# Patient Record
Sex: MALE | Race: WHITE | NOT HISPANIC OR LATINO
[De-identification: names, ages, dates, MRNs, and addresses within clinical notes are randomized per-mention and may not be internally consistent; named-entity substitution may affect disease eponyms.]

---

## 2021-06-23 ENCOUNTER — TRANSCRIPTION ENCOUNTER (OUTPATIENT)
Age: 26
End: 2021-06-23

## 2021-07-03 ENCOUNTER — TRANSCRIPTION ENCOUNTER (OUTPATIENT)
Age: 26
End: 2021-07-03

## 2021-07-11 ENCOUNTER — TRANSCRIPTION ENCOUNTER (OUTPATIENT)
Age: 26
End: 2021-07-11

## 2021-07-12 ENCOUNTER — TRANSCRIPTION ENCOUNTER (OUTPATIENT)
Age: 26
End: 2021-07-12

## 2021-08-11 ENCOUNTER — TRANSCRIPTION ENCOUNTER (OUTPATIENT)
Age: 26
End: 2021-08-11

## 2021-09-18 ENCOUNTER — TRANSCRIPTION ENCOUNTER (OUTPATIENT)
Age: 26
End: 2021-09-18

## 2021-10-07 ENCOUNTER — TRANSCRIPTION ENCOUNTER (OUTPATIENT)
Age: 26
End: 2021-10-07

## 2022-02-28 ENCOUNTER — TRANSCRIPTION ENCOUNTER (OUTPATIENT)
Age: 27
End: 2022-02-28

## 2022-09-12 ENCOUNTER — NON-APPOINTMENT (OUTPATIENT)
Age: 27
End: 2022-09-12

## 2022-12-12 ENCOUNTER — NON-APPOINTMENT (OUTPATIENT)
Age: 27
End: 2022-12-12

## 2023-05-03 PROBLEM — Z00.00 ENCOUNTER FOR PREVENTIVE HEALTH EXAMINATION: Status: ACTIVE | Noted: 2023-05-03

## 2023-05-04 ENCOUNTER — APPOINTMENT (OUTPATIENT)
Dept: ORTHOPEDIC SURGERY | Facility: CLINIC | Age: 28
End: 2023-05-04
Payer: MEDICAID

## 2023-05-04 ENCOUNTER — NON-APPOINTMENT (OUTPATIENT)
Age: 28
End: 2023-05-04

## 2023-05-04 VITALS — BODY MASS INDEX: 29.84 KG/M2 | HEIGHT: 75 IN | WEIGHT: 240 LBS

## 2023-05-04 DIAGNOSIS — Q74.0 OTHER CONGENITAL MALFORMATIONS OF UPPER LIMB(S), INCLUDING SHOULDER GIRDLE: ICD-10-CM

## 2023-05-04 PROCEDURE — 99203 OFFICE O/P NEW LOW 30 MIN: CPT

## 2023-05-08 PROBLEM — Q74.0: Status: ACTIVE | Noted: 2023-05-08

## 2023-05-08 NOTE — HISTORY OF PRESENT ILLNESS
[de-identified] : Initial Visit for Right Shoulder\par Reason: Labrum Tear at Right Shoulder / Left Shoulder - Tear \par Duration: 07/2022  \par Prior Studies: [facility] - CT 03/2023 / MRI 07/2022  / PT \par Medical Hx: Right Shoulder Dislocation \par Dr. Mark Davidson - Recommended for Surgery Consult \par Aggravating FX: Over Head / Movement above Shoulder Height / Lifting\par Symptoms: Prolonged use causes constant / Radiates towards Scapula\par Alleviating FX: PT/ ICE \par Pain Medication: Ibuprofen - Temporary Relief \par NKDA

## 2023-05-08 NOTE — REASON FOR VISIT
[Initial Visit] : an initial visit for [Shoulder Pain] : shoulder pain [FreeTextEntry2] : Right [place]

## 2023-05-08 NOTE — ASSESSMENT
[FreeTextEntry1] : Lengthy discussion was had with the patient regarding exam history and particular imaging as well as the hypoplastic posterior glenoid deficiency and treatment options at this time patient elects to consider his options and I reviewed at length with him recommendation would be at this point given failure of nonoperative measures for surgical consideration.  Patient expresses understanding that ultimately given that glenoid height was plasia he may require posterior bone grafting glenoid reconstruction but I did review with him consideration to arthroscopic stabilization procedures eluding anterior labral stabilization with capsulorrhaphy as well as consideration of remplissage.  I explained at length that this may not fully alleviate all his symptoms and he would like to consider his options

## 2023-05-08 NOTE — PHYSICAL EXAM
[de-identified] : Right Shoulder:\par Constitutional:\par The patient is healthy-appearing and in no apparent distress. \par \par Cardiovascular System: \par The capillary refill is less than 2 seconds. \par \par Skin: \par There are no skin abnormalities.\par \par C-Spine/Neck:\par \par Active Range of Motion:\par Flexion				50\par Extension			60\par Lateral rotation			80  \par \par Right Shoulder: \par Inspection: \par There is no atrophy, erythema, warmth, swelling.\par There is no scapular winging.\par There is no AC prominence. \par \par Bony Palpation: \par There is no tenderness of the clavicle.\par There is no tenderness of the acromioclavicular joint.\par There is no tenderness of the greater tuberosity. \par There is no tenderness of the bicipital groove.\par  \par Soft Tissue Palpation: \par There is no tenderness of the trapezius.\par There is no tenderness of the rhomboid.\par There is no tenderness of the subacromial bursa. \par \par Active Range of Motion: \par Forward flexion- 				180 \par Abduction-					150\par External rotation at 0 degrees abduction-	80 \par Internal rotation at 0 degrees abduction-	80\par \par Passive Range of Motion: \par Forward flexion- 			180 \par Abduction-				150\par External rotation at 0 deg abduction-	80 \par Internal rotation at 0 deg abduction-	80\par \par Special Tests: \par Hawkin's  				Positive \par Neer's  				Positive \par Speed's  				Negative\par AC cross-over 			            Negative\par Arcadia's  				Negative\par \par Stability: \par There is no general laxity. \par There is POSITVIE anterior apprehension.\par \par Strength: \par Supraspinatus abduction 		5/5\par External rotation at 0 deg abduction 	5/5\par Internal rotation at 0 deg abduction	5/5\par Scapular elevation 			5/5 \par \par Neurological System: \par \par There is normal sensation to light touch C5-T1. \par \par Stability: \par There is no general laxity. \par \par Psychiatric: \par The patient demonstrates a normal mood and affect and is active and alert.\par  [de-identified] : MRI of right shoulder reveals anterior labral tearing with a hypoplastic posterior posterior glenoid mild posterior subluxation of the humeral head on the glenoid.\par CT of right shoulder.  There is a hypoplastic posterior glenoid as well as mild posterior subluxation

## 2023-07-14 ENCOUNTER — APPOINTMENT (OUTPATIENT)
Dept: ORTHOPEDIC SURGERY | Facility: HOSPITAL | Age: 28
End: 2023-07-14
Payer: MEDICAID

## 2023-07-14 PROCEDURE — 29806 SHO ARTHRS SRG CAPSULORRAPHY: CPT | Mod: RT

## 2023-07-18 ENCOUNTER — APPOINTMENT (OUTPATIENT)
Dept: ORTHOPEDIC SURGERY | Facility: CLINIC | Age: 28
End: 2023-07-18
Payer: MEDICAID

## 2023-07-18 ENCOUNTER — TRANSCRIPTION ENCOUNTER (OUTPATIENT)
Age: 28
End: 2023-07-18

## 2023-07-18 VITALS — BODY MASS INDEX: 29.84 KG/M2 | WEIGHT: 240 LBS | HEIGHT: 75 IN

## 2023-07-18 PROCEDURE — 99024 POSTOP FOLLOW-UP VISIT: CPT

## 2023-07-18 NOTE — HISTORY OF PRESENT ILLNESS
[de-identified] : s/p RIGHT shoulder arthroscopy with posterior labral repair, capsulorraphy [de-identified] : Patient is 4 days postop and states overall he is feeling much improved but he is requiring intermittent pain medication.  States he has been maintaining the sling at all times except for shower.  He denies any paresthesias [de-identified] : On exam of the right shoulder, postop sling is intact dressing was removed wounds are clean dry and intact with sutures which were removed and Steri-Stripped.  Normal sensation light touch throughout the upper extremity C5-T1 with full range of motion about the elbow [de-identified] : s/p RIGHT shoulder arthroscopy with posterior labral repair, capsulorraphy [de-identified] : Discussed at length with patient intraoperative findings and underlying arthritis as well as capsulorrhaphy and labral repair and patient is time expresses understanding and will maintain the sling at all times for the next 3 weeks with follow-up at that time with daily elbow range of motion exercises reviewed

## 2023-07-20 ENCOUNTER — TRANSCRIPTION ENCOUNTER (OUTPATIENT)
Age: 28
End: 2023-07-20

## 2023-07-20 RX ORDER — OXYCODONE AND ACETAMINOPHEN 5; 325 MG/1; MG/1
5-325 TABLET ORAL
Qty: 40 | Refills: 0 | Status: ACTIVE | COMMUNITY
Start: 2023-07-14 | End: 1900-01-01

## 2023-08-08 ENCOUNTER — APPOINTMENT (OUTPATIENT)
Dept: ORTHOPEDIC SURGERY | Facility: CLINIC | Age: 28
End: 2023-08-08

## 2023-08-08 ENCOUNTER — APPOINTMENT (OUTPATIENT)
Dept: ORTHOPEDIC SURGERY | Facility: CLINIC | Age: 28
End: 2023-08-08
Payer: MEDICAID

## 2023-08-08 PROCEDURE — 99024 POSTOP FOLLOW-UP VISIT: CPT

## 2023-08-08 RX ORDER — OXYCODONE AND ACETAMINOPHEN 5; 325 MG/1; MG/1
5-325 TABLET ORAL
Qty: 30 | Refills: 0 | Status: ACTIVE | COMMUNITY
Start: 2023-08-08 | End: 1900-01-01

## 2023-08-08 NOTE — HISTORY OF PRESENT ILLNESS
[de-identified] : s/p RIGHT shoulder arthroscopy with posterior labral repair, capsulorraphy [de-identified] : Patient is 3.5 weeks postop and states overall he is feeling much improved but he is requiring intermittent pain medication.  States he has been removing his sling at [facility] and using his arm and moving his shoulder for some activities (against medical advice).   He denies any paresthesias [de-identified] : On exam of the right shoulder, postop sling is intact.  Wounds are healed. Normal sensation light touch throughout the upper extremity C5-T1 with full range of motion about the elbow.  Shoulder AROM:  FF- 100, ER- 60 IR 90 [de-identified] : s/p RIGHT shoulder arthroscopy with posterior labral repair, capsulorraphy [de-identified] : Discussed at length with patient intraoperative findings and underlying arthritis as well as capsulorrhaphy and labral repair,  .  Reviewed at length with patient my prior recommendations both preop and postop call and postop appointment for sling immobilization to maximize efficacy of the surgery-patient acknowledges this prior conversation.  Sling for the next 2 days and then begin physical therapy in 1 week and he will follow-up in 3 weeks

## 2023-08-29 ENCOUNTER — APPOINTMENT (OUTPATIENT)
Dept: ORTHOPEDIC SURGERY | Facility: CLINIC | Age: 28
End: 2023-08-29
Payer: MEDICAID

## 2023-08-29 PROCEDURE — 99024 POSTOP FOLLOW-UP VISIT: CPT

## 2023-08-29 NOTE — HISTORY OF PRESENT ILLNESS
[de-identified] : s/p RIGHT shoulder arthroscopy with posterior labral repair, capsulorraphy [de-identified] : Patient is 6.5 weeks postop and states overall he is feeling much improved.  He denies any paresthesias Patient states he has not begun any physical therapy as of yet  [de-identified] : On exam of the right shoulder. Normal sensation light touch throughout the upper extremity C5-T1 with full range of motion about the elbow.  Shoulder AROM:  FF- 120, ER- 60 IR 90 [de-identified] : s/p RIGHT shoulder arthroscopy with posterior labral repair, capsulorraphy [de-identified] : Follow-up in 4 weeks.  Discussed at length with patient intraoperative findings and underlying arthritis as well as capsulorrhaphy and labral repair.  Activity restrictions discussed.

## 2023-08-30 ENCOUNTER — TRANSCRIPTION ENCOUNTER (OUTPATIENT)
Age: 28
End: 2023-08-30

## 2023-08-30 RX ORDER — OXYCODONE AND ACETAMINOPHEN 5; 325 MG/1; MG/1
5-325 TABLET ORAL
Qty: 30 | Refills: 0 | Status: ACTIVE | COMMUNITY
Start: 2023-08-30 | End: 1900-01-01

## 2023-10-19 ENCOUNTER — APPOINTMENT (OUTPATIENT)
Dept: ORTHOPEDIC SURGERY | Facility: CLINIC | Age: 28
End: 2023-10-19
Payer: MEDICAID

## 2023-10-19 DIAGNOSIS — M25.311 OTHER INSTABILITY, RIGHT SHOULDER: ICD-10-CM

## 2023-10-19 PROCEDURE — 99212 OFFICE O/P EST SF 10 MIN: CPT

## 2023-10-23 PROBLEM — M25.311 INSTABILITY OF RIGHT SHOULDER JOINT: Status: ACTIVE | Noted: 2023-06-30

## 2023-12-07 ENCOUNTER — APPOINTMENT (OUTPATIENT)
Dept: ORTHOPEDIC SURGERY | Facility: CLINIC | Age: 28
End: 2023-12-07

## 2024-12-12 ENCOUNTER — NON-APPOINTMENT (OUTPATIENT)
Age: 29
End: 2024-12-12

## 2024-12-12 ENCOUNTER — APPOINTMENT (OUTPATIENT)
Dept: OTOLARYNGOLOGY | Facility: CLINIC | Age: 29
End: 2024-12-12
Payer: MEDICAID

## 2024-12-12 DIAGNOSIS — Z78.9 OTHER SPECIFIED HEALTH STATUS: ICD-10-CM

## 2024-12-12 DIAGNOSIS — R06.83 SNORING: ICD-10-CM

## 2024-12-12 DIAGNOSIS — Z82.5 FAMILY HISTORY OF ASTHMA AND OTHER CHRONIC LOWER RESPIRATORY DISEASES: ICD-10-CM

## 2024-12-12 DIAGNOSIS — G47.33 OBSTRUCTIVE SLEEP APNEA (ADULT) (PEDIATRIC): ICD-10-CM

## 2024-12-12 PROCEDURE — 31231 NASAL ENDOSCOPY DX: CPT

## 2024-12-12 PROCEDURE — 99203 OFFICE O/P NEW LOW 30 MIN: CPT | Mod: 25

## 2024-12-12 RX ORDER — LEVOCETIRIZINE DIHYDROCHLORIDE 5 MG/1
TABLET, FILM COATED ORAL
Refills: 0 | Status: ACTIVE | COMMUNITY

## 2024-12-12 RX ORDER — OMEPRAZOLE 40 MG/1
CAPSULE, DELAYED RELEASE ORAL
Refills: 0 | Status: ACTIVE | COMMUNITY

## 2025-01-31 ENCOUNTER — APPOINTMENT (OUTPATIENT)
Dept: SLEEP CENTER | Facility: HOSPITAL | Age: 30
End: 2025-01-31